# Patient Record
(demographics unavailable — no encounter records)

---

## 2025-04-25 NOTE — PROCEDURE
[Transvaginal OB Sonogram] : Transvaginal OB Sonogram [Intrauterine Pregnancy] : intrauterine pregnancy [Yolk Sac] : yolk sac present [Fetal Heart] : no fetal heart [Current GA by Sonogram: ___ (wks)] : Current GA by Sonogram: [unfilled]Uwks [___ day(s)] : [unfilled] days [FreeTextEntry1] : c/w MAB

## 2025-04-25 NOTE — PLAN
[FreeTextEntry1] : 32yo P) with Missed   HCG/T&S done discussed medical vs surgical mgt pt opted for medical mgt Mifepristone given RX misoprostol all questions answered F/U 2 wks

## 2025-04-25 NOTE — HISTORY OF PRESENT ILLNESS
[FreeTextEntry1] : 31 year old presents to [facility] care and for an amenorrhea visit LMP 02/21. Pt was seen by a doc a few days ago and she was diagnosed with a missed AB and presents for a second opinion. Denies pain or bleeding  OBH: None GYNH:   PMH: Asthma  PSH: None   Currents Meds: Albuterol Allergies:   SH: None Family: Stroke (father)  [BreastSonogramDate] : 08/14/2024